# Patient Record
Sex: FEMALE | Race: WHITE | Employment: FULL TIME | URBAN - METROPOLITAN AREA
[De-identification: names, ages, dates, MRNs, and addresses within clinical notes are randomized per-mention and may not be internally consistent; named-entity substitution may affect disease eponyms.]

---

## 2024-07-01 ENCOUNTER — OFFICE VISIT (OUTPATIENT)
Dept: SURGERY | Facility: CLINIC | Age: 59
End: 2024-07-01
Payer: COMMERCIAL

## 2024-07-01 VITALS
HEIGHT: 65 IN | BODY MASS INDEX: 25.36 KG/M2 | HEART RATE: 84 BPM | DIASTOLIC BLOOD PRESSURE: 91 MMHG | TEMPERATURE: 98 F | RESPIRATION RATE: 20 BRPM | SYSTOLIC BLOOD PRESSURE: 139 MMHG | WEIGHT: 152.19 LBS

## 2024-07-01 DIAGNOSIS — Z85.3 HISTORY OF BREAST CANCER: ICD-10-CM

## 2024-07-01 DIAGNOSIS — C43.61 MALIGNANT MELANOMA OF RIGHT FOREARM (HCC): Primary | ICD-10-CM

## 2024-07-01 DIAGNOSIS — E31.21: ICD-10-CM

## 2024-07-01 PROBLEM — Z98.890 HISTORY OF MELANOMA EXCISION: Status: ACTIVE | Noted: 2024-07-01

## 2024-07-01 PROBLEM — Z85.820 HISTORY OF MELANOMA EXCISION: Status: ACTIVE | Noted: 2024-07-01

## 2024-07-01 PROBLEM — D22.9 ATYPICAL NEVUS: Status: ACTIVE | Noted: 2024-07-01

## 2024-07-01 PROCEDURE — 3080F DIAST BP >= 90 MM HG: CPT | Performed by: SURGERY

## 2024-07-01 PROCEDURE — 3008F BODY MASS INDEX DOCD: CPT | Performed by: SURGERY

## 2024-07-01 PROCEDURE — 99245 OFF/OP CONSLTJ NEW/EST HI 55: CPT | Performed by: SURGERY

## 2024-07-01 PROCEDURE — 3075F SYST BP GE 130 - 139MM HG: CPT | Performed by: SURGERY

## 2024-07-01 NOTE — CONSULTS
[facility]        Patient Name:  Shanice Stanford   YOB: 1965   Gender:  Female   Appt Date:  7/1/2024   Provider:  Raymond Rogers MD     PATIENT PROVIDERS  Referring Provider: Lori Villareal MD    Primary Care Provider:Deja Sharif PA-C        CHIEF COMPLAINT  New Consult: Melanoma of right forearm     PROBLEMS  Reviewed   Patient Active Problem List   Diagnosis    Displacement of lumbar intervertebral disc without myelopathy    Cervicalgia    Degeneration of cervical intervertebral disc    Lumbago    Multiple endocrine neoplasia type I (HCC)    History of breast cancer    Atypical nevus    Malignant melanoma of right forearm (HCC)    History of melanoma excision        History of Present Illness:  Patient is a 58 year old women who is currently being referred for consideration of surgical oncology management of recently diagnosed melanoma of right forearm. Hx of excision of melanoma on L thigh(26 years ago), triple negative breast cancer (Negative for BRCA gene; Radiation and chemo- 8 years ago), and endocrine neoplasia (monitored by Joy Cannon MD).     Patient had concerns of a skin lesion and was seen by Dr. Lori Villareal on 06/18/2024 skin examination.  She noticed this lesion in winter as a dry spot. She denies any bleeding, pruritus, or scaling of the lesion. In office, an excisional biopsy was performed. This revealed a tumor thickness of 0.5 mm (pT1a).     Patient has MEN, she is followed by Dr. Joy Cannon at [facility].    She endorses blistering sunburns and prior history of extended periods in the sun. She does have a history of melanoma of her left thigh. Denies any family history of skin cancer.      Vital Signs:  BP (!) 139/91 (BP Location: Right arm, Patient Position: Sitting, Cuff Size: adult)   Pulse 84   Temp 98.3 °F (36.8 °C) (Temporal)   Resp 20   Ht 1.651 m (5' 5\")   Wt 69 kg (152 lb 3.2 oz)   BMI 25.33 kg/m²      Medications  Reviewed:    Current Outpatient Medications:     LEVOTHROID 75 MCG OR TABS, 1 TABLET DAILY, Disp: , Rfl:      Allergies Reviewed:  Allergies   Allergen Reactions    Hydrocodone-Acetaminophen NAUSEA AND VOMITING    Adhesive Tape ITCHING     More with length of time        History:  Reviewed:  Past Medical History:    CANCER    skin    Hx of breast cancer    HYPOTHYROIDISM    KIDNEY STONE    OTHER DISEASES    hx multiple endocrine neoplasia    VARICELLA      Reviewed:  Past Surgical History:   Procedure Laterality Date    Breast reconstruction      x2    Mastectomy left      Other surgical history  1999    excision of melanoma L thigh    Other surgical history  1995    lithotrypsyx2;removal of stone    Parathyroidectomy      Tonsillectomy        Reviewed Social History:  Social History     Socioeconomic History    Marital status:    Tobacco Use    Smoking status: Former     Current packs/day: 0.00     Average packs/day: 0.5 packs/day for 10.0 years (5.0 ttl pk-yrs)     Types: Cigarettes     Start date: 1980     Quit date: 1990     Years since quittin.1   Vaping Use    Vaping status: Never Used   Substance and Sexual Activity    Alcohol use: Yes     Comment: socially    Drug use: No    Sexual activity: Yes     Partners: Male      Reviewed:  Family History   Problem Relation Age of Onset    Neurological Disorder Mother         hx benign pituitary tumor    Other Mother         multiple endocrin neoplasia    Diabetes Mother     Stroke Mother     Pulmonary Disease Mother         pneumonia    Renal Disease Mother         kidney stones    Pulmonary Disease Father         COPD    Heart Disease Father       Review of Systems:  GENERAL HEALTH: feels well, no fatigue.   SKIN: no change in mole.   HEENT: denies pain  RESPIRATORY: denies shortness of breath  CARDIOVASCULAR: denies chest pain  GI: denies nausea, vomiting, constipation, diarrhea; no rectal bleeding  GENITAL/: no blood in  urine  MUSCULOSKELETAL: no joint complaints, no back pain  NEURO: no tingling, numbness, weakness  ENDOCRINE: denies weight loss/gain  PSYCH: anxious       Physical Examination:  Constitutional: NAD.   Eyes: Sclera: non-icteric.   Lymph Nodes: Lymph Nodes no cervical LAD, supraclavicular LAD, axillary LAD, or inguinal LAD.   Lungs: Normal respiratory effort.  Cardiovascular: Well-perfused.  Abdomen: Soft, no masses.  Musculoskeletal: Extremities: no edema.   Skin: Biopsy site right forearm healing well.  Sutures are in place.  Scar is oriented transverse obliquely measures about 3 cm.  There are no satellite lesions.  No in-transit metastases.  No epitrochlear lymphadenopathy.  Remainder of skin examination unremarkable.     Document Review:  06/18/2024 [facility]  Case number [id number]  Right flexor forearm (excision):  Histologic type: Superficial spreading  Maximum tumor thickness: 0.5 mm  Ulceration: Not present  Peripheral margin: Not involved  Deep margin: Not involved  Lymphovascular invasion: Not seen  Perineural invasion: Not seen  Tumor regression: Present  AJCC stage /TNM: pT1a  Mitotic rate: None     Procedure(s):  None     Assessment / Plan:  1. Malignant melanoma of right forearm (pT1a cN0)  -Prior history of melanoma left posterior thigh.  Findings were discussed with patient at length.  Her  was present.  Entity melanoma and its staging were discussed.  Patient understands that she will require a wide excision with a 1 cm margin.  The site is slightly swollen and will allow for a period of healing prior to undertaking surgical management.  Will reevaluate in about 1 months.  At that time, will further discuss whether reconstruction with our plastic surgery colleagues is warranted.  Timing works out well for her as she will be on vacation August 10 through 17th.  Otherwise, wide excision can be done under local anesthesia.  The surgical treatment was discussed with her including  indications and risks.  Patient agreed and understood.  She and her  had ample time to ask questions.    2. Multiple endocrine neoplasia type I (HCC)  Follows up at [facility].    3.  History of breast cancer  Last mammogram August 2023.      Follow Up:  In 3 to 4 weeks.       Electronically Signed by:     Raymond Rogers MD FACS  Chief of Surgical Oncology, [facility]  Professor of Surgery, [facility]  [phone]

## 2024-07-03 DIAGNOSIS — C43.61 MALIGNANT MELANOMA OF RIGHT FOREARM (HCC): Primary | ICD-10-CM

## 2024-07-05 ENCOUNTER — LAB ENCOUNTER (OUTPATIENT)
Dept: LAB | Facility: HOSPITAL | Age: 59
End: 2024-07-05
Attending: SURGERY
Payer: COMMERCIAL

## 2024-07-05 DIAGNOSIS — C43.61 MALIGNANT MELANOMA OF ARM, RIGHT (HCC): Primary | ICD-10-CM

## 2024-07-05 PROCEDURE — 88321 CONSLTJ&REPRT SLD PREP ELSWR: CPT

## 2024-07-24 ENCOUNTER — OFFICE VISIT (OUTPATIENT)
Dept: SURGERY | Facility: CLINIC | Age: 59
End: 2024-07-24
Payer: COMMERCIAL

## 2024-07-24 VITALS
HEART RATE: 81 BPM | TEMPERATURE: 98 F | BODY MASS INDEX: 25 KG/M2 | SYSTOLIC BLOOD PRESSURE: 132 MMHG | RESPIRATION RATE: 20 BRPM | WEIGHT: 153 LBS | DIASTOLIC BLOOD PRESSURE: 84 MMHG

## 2024-07-24 DIAGNOSIS — C43.61 MALIGNANT MELANOMA OF RIGHT FOREARM (HCC): Primary | ICD-10-CM

## 2024-07-24 PROCEDURE — 3075F SYST BP GE 130 - 139MM HG: CPT | Performed by: SURGERY

## 2024-07-24 PROCEDURE — 3079F DIAST BP 80-89 MM HG: CPT | Performed by: SURGERY

## 2024-07-24 PROCEDURE — 99243 OFF/OP CNSLTJ NEW/EST LOW 30: CPT | Performed by: SURGERY

## 2024-07-24 PROCEDURE — 99213 OFFICE O/P EST LOW 20 MIN: CPT | Performed by: SURGERY

## 2024-07-24 NOTE — PROGRESS NOTES
[facility]        Patient Name:  Shanice Stanford   YOB: 1965   Gender:  Female   Appt Date:  7/24/2024   Provider:  Raymond Rogers MD     PATIENT PROVIDERS    Primary Care Provider:Deja Sharif PA-C        CHIEF COMPLAINT  New Consult: Melanoma of right forearm     PROBLEMS  Reviewed   Patient Active Problem List   Diagnosis    Displacement of lumbar intervertebral disc without myelopathy    Cervicalgia    Degeneration of cervical intervertebral disc    Lumbago    Multiple endocrine neoplasia type I (HCC)    History of breast cancer    Atypical nevus    Malignant melanoma of right forearm (HCC)    History of melanoma excision        History of Present Illness:  Patient returns today for a follow-up visit.    She is a 58 year old women who is currently being referred for consideration of surgical oncology management of recently diagnosed melanoma of right forearm.   She saw me in consultation on 7/1/2024 and the site was swollen.  We are allowing for.  Of healing and thus this follow-up visit.    History:  Patient had concerns of a skin lesion and was seen by Dr. Lori Villareal on 06/18/2024 skin examination.  She noticed this lesion in winter as a dry spot. She denies any bleeding, pruritus, or scaling of the lesion. In office, an excisional biopsy was performed. This revealed a tumor thickness of 0.5 mm (pT1a).     Patient has MEN, she is followed by Dr. Joy Cannon at [facility].     Vital Signs:  /84 (BP Location: Right arm, Patient Position: Sitting, Cuff Size: adult)   Pulse 81   Temp 97.9 °F (36.6 °C) (Temporal)   Resp 20   Wt 69.4 kg (153 lb)   BMI 25.46 kg/m²      Medications Reviewed:    Current Outpatient Medications:     LEVOTHROID 75 MCG OR TABS, 1 TABLET DAILY, Disp: , Rfl:      Allergies Reviewed:  Allergies   Allergen Reactions    Hydrocodone-Acetaminophen NAUSEA AND VOMITING    Adhesive Tape ITCHING     More with length of time         History:  Reviewed:  Past Medical History:    CANCER    skin    Hx of breast cancer    HYPOTHYROIDISM    KIDNEY STONE    OTHER DISEASES    hx multiple endocrine neoplasia    VARICELLA      Reviewed:  Past Surgical History:   Procedure Laterality Date    Breast reconstruction      x2    Mastectomy left      Other surgical history  1999    excision of melanoma L thigh    Other surgical history  1995    lithotrypsyx2;removal of stone    Parathyroidectomy      Tonsillectomy        Reviewed Social History:  Social History     Socioeconomic History    Marital status:    Tobacco Use    Smoking status: Former     Current packs/day: 0.00     Average packs/day: 0.5 packs/day for 10.0 years (5.0 ttl pk-yrs)     Types: Cigarettes     Start date: 1980     Quit date: 1990     Years since quittin.1   Vaping Use    Vaping status: Never Used   Substance and Sexual Activity    Alcohol use: Yes     Comment: socially    Drug use: No    Sexual activity: Yes     Partners: Male      Reviewed:  Family History   Problem Relation Age of Onset    Neurological Disorder Mother         hx benign pituitary tumor    Other Mother         multiple endocrin neoplasia    Diabetes Mother     Stroke Mother     Pulmonary Disease Mother         pneumonia    Renal Disease Mother         kidney stones    Pulmonary Disease Father         COPD    Heart Disease Father       Review of Systems:  GENERAL HEALTH: feels well, no fatigue.   SKIN: +change in mole right flank/lower back.   RESPIRATORY: denies shortness of breath  CARDIOVASCULAR: denies chest pain  GI: No symptoms  PSYCH: anxious       Physical Examination:  Constitutional: NAD.   Eyes: Sclera: non-icteric.   Lungs: Clear  Cardiovascular: Regular rate  Abdomen: Soft, no masses.  Musculoskeletal: Extremities: no edema.   Skin: Biopsy site right forearm healing well.  Swelling is markedly down.  Skin lesion of concern right lower back/flank is about 5 mm slightly  irregular and homogenous color.     Document Review:  06/18/2024 [facility]  Case number [id number]  Right flexor forearm (excision):  Histologic type: Superficial spreading  Maximum tumor thickness: 0.5 mm  Ulceration: Not present  Peripheral margin: Not involved  Deep margin: Not involved  Lymphovascular invasion: Not seen  Perineural invasion: Not seen  Tumor regression: Present  AJCC stage /TNM: pT1a  Mitotic rate: None     Procedure(s):  None     Assessment / Plan:  1. Malignant melanoma of right forearm (pT1a cN0)  -Prior history of melanoma left posterior thigh.  Findings were discussed with patient at length.    Biopsy site is much less swollen than prior examination.  We can proceed with scheduling wide excision with a 1 cm margin.  Patient will meet with Dr. Luis from plastic surgery for consideration of reconstruction.  From my standpoint, I can do a wide excision under local anesthesia and should Dr. Luis require sedation/general anesthesia, I discussed further risks.  The surgical treatment matter including risks was discussed.  Patient agreed and understood.  Arrangements will be made to schedule the procedure.  May consider biopsying of skin lesion right lower back/flank at that time.  Patient had ample time to ask questions.         Follow Up:  To schedule wide excision.       Electronically Signed by:     Raymond Rogers MD FACS  Chief of Surgical Oncology, [facility]  Professor of Surgery, [facility]  [phone]

## 2024-07-24 NOTE — PATIENT INSTRUCTIONS
Surgeon:              Dr. Pricilla Luis, PhD                                          Tel:         [phone]                                  Fax:        [phone]      Surgery/Procedure: Right forearm reconstruction with complex closure, possible grafts, possible local tissue rearrangement  1.25 hours for Dr. Luis's portion, joint with Dr. Rogers, ALBERTO anesthesia, outpatient     Dx Code: [C43.61]     Hospital:  [facility]: [address] [place] [address]           [phone]  [facility]: [address], [place] [address]               [phone]    1. Someone will need to drive you to and from the hospital if your procedure is outpatient.    2.Do not drink alcohol or smoke 24 hours prior to your procedure.    3. Bring a picture ID and your insurance card.    4. You will be contacted by the hospital the day before to confirm the procedure time and location.     5. Do not take any herbal supplements or blood thinners at least one week before your procedure/surgery. This includes NSAID's (aspirin, baby aspirin, Motrin, Ibuprofen, Aleve, Advil, Naproxen, etc), fish oil, vitamin E, turmeric, CoQ10, or green tea supplements, etc. *TYLENOL or acetaminophen is ok to take*    6. PRE-OPERATIVE TESTING: History and physical with medical clearance is REQUIRED within 30 days of the surgery date and is mandatory per Dr. Luis. *If this is not done, your surgery will be postponed*  MEDICAL CLEARANCE WITH DR. LENTZ  CBC  CMP  EKG (within 90 days)    7. Please inform us if you start or change any medications at least one week before surgery (ex: blood thinners, weight loss medications, diabetic medications, herbal supplements, etc)    8. Does patient have diagnosis of sleep apnea?    [   ] Yes     [ X ]  No    Consent obtained   Photos taken on 7/24/24

## 2024-07-24 NOTE — H&P (VIEW-ONLY)
[facility]        Patient Name:  Shanice Stanford   YOB: 1965   Gender:  Female   Appt Date:  7/24/2024   Provider:  Raymond Rogers MD     PATIENT PROVIDERS    Primary Care Provider:Deja Sharif PA-C        CHIEF COMPLAINT  New Consult: Melanoma of right forearm     PROBLEMS  Reviewed   Patient Active Problem List   Diagnosis    Displacement of lumbar intervertebral disc without myelopathy    Cervicalgia    Degeneration of cervical intervertebral disc    Lumbago    Multiple endocrine neoplasia type I (HCC)    History of breast cancer    Atypical nevus    Malignant melanoma of right forearm (HCC)    History of melanoma excision        History of Present Illness:  Patient returns today for a follow-up visit.    She is a 58 year old women who is currently being referred for consideration of surgical oncology management of recently diagnosed melanoma of right forearm.   She saw me in consultation on 7/1/2024 and the site was swollen.  We are allowing for.  Of healing and thus this follow-up visit.    History:  Patient had concerns of a skin lesion and was seen by Dr. Lori Villareal on 06/18/2024 skin examination.  She noticed this lesion in winter as a dry spot. She denies any bleeding, pruritus, or scaling of the lesion. In office, an excisional biopsy was performed. This revealed a tumor thickness of 0.5 mm (pT1a).     Patient has MEN, she is followed by Dr. Joy Cannon at [facility].     Vital Signs:  /84 (BP Location: Right arm, Patient Position: Sitting, Cuff Size: adult)   Pulse 81   Temp 97.9 °F (36.6 °C) (Temporal)   Resp 20   Wt 69.4 kg (153 lb)   BMI 25.46 kg/m²      Medications Reviewed:    Current Outpatient Medications:     LEVOTHROID 75 MCG OR TABS, 1 TABLET DAILY, Disp: , Rfl:      Allergies Reviewed:  Allergies   Allergen Reactions    Hydrocodone-Acetaminophen NAUSEA AND VOMITING    Adhesive Tape ITCHING     More with length of time         History:  Reviewed:  Past Medical History:    CANCER    skin    Hx of breast cancer    HYPOTHYROIDISM    KIDNEY STONE    OTHER DISEASES    hx multiple endocrine neoplasia    VARICELLA      Reviewed:  Past Surgical History:   Procedure Laterality Date    Breast reconstruction      x2    Mastectomy left      Other surgical history  1999    excision of melanoma L thigh    Other surgical history  1995    lithotrypsyx2;removal of stone    Parathyroidectomy      Tonsillectomy        Reviewed Social History:  Social History     Socioeconomic History    Marital status:    Tobacco Use    Smoking status: Former     Current packs/day: 0.00     Average packs/day: 0.5 packs/day for 10.0 years (5.0 ttl pk-yrs)     Types: Cigarettes     Start date: 1980     Quit date: 1990     Years since quittin.1   Vaping Use    Vaping status: Never Used   Substance and Sexual Activity    Alcohol use: Yes     Comment: socially    Drug use: No    Sexual activity: Yes     Partners: Male      Reviewed:  Family History   Problem Relation Age of Onset    Neurological Disorder Mother         hx benign pituitary tumor    Other Mother         multiple endocrin neoplasia    Diabetes Mother     Stroke Mother     Pulmonary Disease Mother         pneumonia    Renal Disease Mother         kidney stones    Pulmonary Disease Father         COPD    Heart Disease Father       Review of Systems:  GENERAL HEALTH: feels well, no fatigue.   SKIN: +change in mole right flank/lower back.   RESPIRATORY: denies shortness of breath  CARDIOVASCULAR: denies chest pain  GI: No symptoms  PSYCH: anxious       Physical Examination:  Constitutional: NAD.   Eyes: Sclera: non-icteric.   Lungs: Clear  Cardiovascular: Regular rate  Abdomen: Soft, no masses.  Musculoskeletal: Extremities: no edema.   Skin: Biopsy site right forearm healing well.  Swelling is markedly down.  Skin lesion of concern right lower back/flank is about 5 mm slightly  irregular and homogenous color.     Document Review:  06/18/2024 [facility]  Case number [id number]  Right flexor forearm (excision):  Histologic type: Superficial spreading  Maximum tumor thickness: 0.5 mm  Ulceration: Not present  Peripheral margin: Not involved  Deep margin: Not involved  Lymphovascular invasion: Not seen  Perineural invasion: Not seen  Tumor regression: Present  AJCC stage /TNM: pT1a  Mitotic rate: None     Procedure(s):  None     Assessment / Plan:  1. Malignant melanoma of right forearm (pT1a cN0)  -Prior history of melanoma left posterior thigh.  Findings were discussed with patient at length.    Biopsy site is much less swollen than prior examination.  We can proceed with scheduling wide excision with a 1 cm margin.  Patient will meet with Dr. Luis from plastic surgery for consideration of reconstruction.  From my standpoint, I can do a wide excision under local anesthesia and should Dr. Luis require sedation/general anesthesia, I discussed further risks.  The surgical treatment matter including risks was discussed.  Patient agreed and understood.  Arrangements will be made to schedule the procedure.  May consider biopsying of skin lesion right lower back/flank at that time.  Patient had ample time to ask questions.         Follow Up:  To schedule wide excision.       Electronically Signed by:     Raymond Rogers MD FACS  Chief of Surgical Oncology, [facility]  Professor of Surgery, [facility]  [phone]

## 2024-07-24 NOTE — CONSULTS
New Patient Consultation    Chief Complaint: Malignant melanoma of right forearm     History of Present Illness:   Shanice Stanford is a 58 year old female referred by Dr. Rogers for evaluation of recently diagnosed malignant melanoma of the right forearm. This was diagnosed 6/18/2024 via in-office excision at her Dermatologist's office. She met with Dr. Rogers today and he is planning for wide excision with a 1 cm margin. She is here today to discuss options for reconstruction.     Pathology from 6/18/2024 revealed:    A. RIGHT FLEXOR FOREARM (EXCISION)  MALIGNANT MELANOMA    Comment: Staging Summary     Histologic Type: Superficial Spreading  Maximum Tumor Thickness: 0.5 mm  Ulceration: Not present  Peripheral Margin: Not involved  Deep Margin: Not involved  Lymphovascular Invasion: Not seen   Perineural Invasion: Not seen  Tumor Regression: Present  AJCC Stage / TNM: pT1a  Mitotic Rate: None        Past Medical History:      Past Medical History:    CANCER    skin    Hx of breast cancer    HYPOTHYROIDISM    KIDNEY STONE    OTHER DISEASES    hx multiple endocrine neoplasia    VARICELLA         Past Surgical History:  Past Surgical History:   Procedure Laterality Date    Breast reconstruction      x2    Mastectomy left      Other surgical history  02/01/1999    excision of melanoma L thigh    Other surgical history  01/01/1995    lithotrypsyx2;removal of stone    Parathyroidectomy      Tonsillectomy           Medications:    No outpatient medications have been marked as taking for the 7/24/24 encounter (Office Visit) with Pricilla Luis MD.         Allergies:    Allergies   Allergen Reactions    Hydrocodone-Acetaminophen NAUSEA AND VOMITING    Adhesive Tape ITCHING     More with length of time         Family History:   Family History   Problem Relation Age of Onset    Neurological Disorder Mother         hx benign pituitary tumor    Other Mother         multiple endocrin neoplasia    Diabetes Mother     Stroke  Mother     Pulmonary Disease Mother         pneumonia    Renal Disease Mother         kidney stones    Pulmonary Disease Father         COPD    Heart Disease Father          Social History:  History   Alcohol Use    Yes     Comment: socially       History   Smoking Status    Former    Types: Cigarettes   Smokeless Tobacco    Not on file       History   Drug Use No       Review of Systems:    A 13 point review of systems was performed on the intake sheet.  The patient reports   General:   The patient denies, fever, chills, night sweats, fatigue, generalized weakness, change in appetite, weight loss, or +weight gain.  Endocrine:   There is no history of poor/slow wound healing, weight loss/+gain, fertility or hormone problems, +cold intolerance, +heat intolerance, excessive thirst, or +thyroid disease.   Allergic/Immunologic:  There is no history of hives, hay fever, angioedema or anaphylaxis.  HEENT:    The patient denies ear pain, ear drainage, +hearing loss, change in vision, double vision, cataracts, glaucoma, nasal congestion, nosebleed, hoarseness, sore throat, or swollen glands  Respiratory:   The patient denies shortness of breath, cough, bloody cough, phlegm, asthma, or wheezing  Cardiovascular:  The patient denies chest pain/pressure, palpitations, irregular heartbeat, high blood pressure, stroke, or leg swelling  Breasts:  Patient denies breast masses, pain, change in the breast skin, skin dimpling, nipple discharge, or rash  Gastrointestinal:   There is no history of +difficulty swallowing, +reflux symptoms, +heartburn, nausea, vomiting, dark/ bloody stools, diarrhea, constipation,  change in bowel habits, or abdominal pain.   Genitourinary:  The patient denies +frequent urination, +needing to get up at night to urinate, urinary hesitancy or retaining urine, painful urination, urinary incontinence, decreased urine stream, blood in the urine, or vaginal/penile discharge.  Skin:   The patient denies rash,  itching, +skin lesions, dry skin, change in skin color or +change in moles, sunburns, or sunburns with blistering.   Hematologic/Lymphatic:  The patient denies easily bruising or bleeding, persistent swollen glands or lymph nodes, bleeding disorders, blood clots, or pulmonary embolism.   Gynecologic:  The patient denies irregular menses, pelvic pain, +pain with intercourse, painful menses, or pregnancy  Musculoskeletal:  The patient denies muscle aches/pain, +joint pain, +stiff joints, neck pain, back pain or bone pain.  Neurologic:  There is no history of migraines or severe headaches, seizure/epilepsy, speech problems, coordination problems, trembling/tremors, fainting/black outs, dizziness, memory problems, loss of sensation/numbness, problems walking, weakness, tingling or burning in hands/feet.   Psychiatric:  There is no history of abusive relationship, bipolar disorder, sleep disturbance, anxiety, depression or feeling of despair.    Physical Exam:    There were no vitals taken for this visit.    Constitutional: The patient is an alert, oriented and well-developed.     Neurologic: Speech patterns and movements are normal.     Psychiatric: Affect is appropriate.    Eyes: Conjunctiva are clear, non-icteric. PERRL    ENT: no obvious abnormality, no ear drainage, mucous membranes moist and pink    Integument/Skin: see Musculoskeletal exam    Respiratory: Normal respiratory effort.     Cardiovascular: no cyanosis, no edema    Musculoskeletal: Right forearm with scar    Impression:   Shanice Stanford  is a 58 year old with malignant melanoma right forearm    Discussion and Plan:  The patient was counseled on the different treatment options.     Dr. Rogers is planning for wide excision with a 1 cm margin. We discussed the option for right forearm reconstruction with complex closure, possible grafts, possible local tissue rearrangement. She is asking for this to be done under MAC anesthesia. This will be done in the  outpatient setting. Of note, the patient has an adhesive allergy. Per patient, she is fine with steri-strips for several days but then sometimes needs to remove them if irritation of the skin is noted.    She is ok with a longer scar but would like to avoid contour irregularities.  The patient is agreeable with this plan.      She also is considering breast reconstruction revision (she had AKILA free flap L breast with Dr Arrington and revision with Dr Rodríguez and Dr Angel, she will address her melanoma first before revisiting any breast revision procedures).    The different treatment options were discussed with the patient.  The procedures and the postoperative care was discussed in detail.  Potential risks complications benefits and alternatives were discussed.  Risks and complications including but not limited to infection, bleeding, scarring, damage to surrounding structures, such as nerves, blood vessels, muscles,  tendons, risk of hematoma, seroma, foreign body reaction, allergic reaction, wound dehiscences, delayed wound healing, graft failure, flap failure, need for further surgery.    Patient understands and wishes to proceed with the procedure, questions were answered.    45 minutes were spent with the patient, from which 35 minutes were spent counseling the patient and coordinating care.

## 2024-07-25 DIAGNOSIS — C43.61 MALIGNANT MELANOMA OF RIGHT FOREARM (HCC): Primary | ICD-10-CM

## 2024-07-25 NOTE — PATIENT INSTRUCTIONS
Surgery:  Wide excision melanoma right forearm    Date of Surgery: TBD with [facility]    Hospital:    [facility] [address] [place] [address]  Phone: [phone]    This is an inpatient / outpatient: Outpatient procedure.  Use the provided Chlorhexadine surgical soap(instructions attached) to shower the night before and morning of your procedure.  Do not apply powders, creams, lotions or deodorant after showering.  Do not apply any kind of makeup and make sure to remove nail polish prior to your surgery.  Bowel Prep: No   If you take Insulin contact your primary care physician for specific instructions regarding dosing around your surgery.  Do not drink alcohol or smoke tobacco products 24 hours prior to procedure.   Bring your picture ID and insurance card with you.  Wear comfortable clothing that can easily be removed. Preferably, something that zips, snaps, or buttons up the front.   You will be contacted by the hospital  for pre-admission COVID-19 testing and the day prior to your surgery to confirm details and give you specific instructions about when and where to arrive the day of your procedure.   If you are taking blood thinners including: Plavix, Eliquis, Xarelto, Coumadin, full strength Aspirin you will need to contact the prescribing provider for specific instructions on holding these medications for your procedure.  Inform your primary care physician of your surgery and ask if him/her will need to see you prior to surgery.  If you develop symptoms of another illness prior to surgery please contact our office immediately.   If this is an inpatient surgery, attending the Surgical Oncology Pre-operative Education Class is strongly encouraged. Email [email or web address] to RSVP or for more class information.       Pre-Operative Testing   CBC  CMP  BMP    PT, PTT, INR  UA  EKG    Chest X-Ray  Cardiac Clearance  H & P Medical Clearance     Raymond Rogers MD, FACS  Chief of  Surgical Oncology  Co-Medical Director, Oncology Services  Professor of Surgery    For Dr. Rogers's office: [phone]  Fax: [phone]  After hours you will reach the answering service    Pre-Admission [phone]  Central [phone]  Medical Records:   [phone]    Diagnosis: Malignant melanoma of right forearm (pT1a cN0)     SURGEON: Dr. Raymond Rogers    LOCATION: [facility]    Type: Outpatient    Length of surgery: 30 minutes  Anesthesia:MAC  Joint case with:  SA:  No   Special Equipment:   Special request:     Allergies:   Allergies   Allergen Reactions    Hydrocodone-Acetaminophen NAUSEA AND VOMITING    Adhesive Tape ITCHING     More with length of time       Orders:  No  -Colon Bundle/Bowel Prep  No  -Type and cross 2 units PRBC  No  -Type and Screen  No  -Advanced Oncology Order Set (HIPEC)  No  -Heparin Pre-Op  No  -Nuclear Med Injection  No  -Wire localization needed  No  -Midline placement (HIPIC, Whipple, Esophagectomy, Liver)  No  -Tylenol administration prior to surgery  Does patient have a pacemaker?: No    No  -CHG Cloths (Main)    P.A.T. orders:     For RN use only:

## 2024-08-01 ENCOUNTER — TELEPHONE (OUTPATIENT)
Dept: SURGERY | Facility: CLINIC | Age: 59
End: 2024-08-01

## 2024-08-01 NOTE — TELEPHONE ENCOUNTER
Disability Certification forms received via email, sending MOVL for Release of Information completion logged for processing.

## 2024-08-06 ENCOUNTER — APPOINTMENT (OUTPATIENT)
Dept: ADMINISTRATIVE | Facility: HOSPITAL | Age: 59
End: 2024-08-06
Payer: COMMERCIAL

## 2024-08-06 ENCOUNTER — TELEPHONE (OUTPATIENT)
Dept: SURGERY | Facility: CLINIC | Age: 59
End: 2024-08-06

## 2024-08-07 DIAGNOSIS — C43.61 MALIGNANT MELANOMA OF RIGHT FOREARM (HCC): Primary | ICD-10-CM

## 2024-08-08 NOTE — TELEPHONE ENCOUNTER
Patient called to confirm Release of Information was received. Informed patient Release of Information received and scanned in chart. Patient verbalized understanding.   Manual Repair Warning Statement: We plan on removing the manually selected variable below in favor of our much easier automatic structured text blocks found in the previous tab. We decided to do this to help make the flow better and give you the full power of structured data. Manual selection is never going to be ideal in our platform and I would encourage you to avoid using manual selection from this point on, especially since I will be sunsetting this feature. It is important that you do one of two things with the customized text below. First, you can save all of the text in a word file so you can have it for future reference. Second, transfer the text to the appropriate area in the Library tab. Lastly, if there is a flap or graft type which we do not have you need to let us know right away so I can add it in before the variable is hidden. No need to panic, we plan to give you roughly 6 months to make the change.

## 2024-08-08 NOTE — TELEPHONE ENCOUNTER
Cld patient/ sent message via Doormen. to encourage call to obtain details, placed on hold but put in Oncology folder for future processing.

## 2024-08-08 NOTE — TELEPHONE ENCOUNTER
Please try to avoid signing forms in the corner as it is not visible when printing and forms are not accepted this way. Thank you!     Dr. Pricilla Luis, Dr. Raymond Rogers,                           (EITHER OF YOU CAN SIGN)      *The ACKNOWLEDGE button has been moved to the top right ribbon*    Please sign off on form if you agree to: disability 08/20/2024-6/8 wk recovery   (place your signature on the first page only)    -From your Inbasket, Highlight the patient and click Chart   -Double click the 08/01/2024 Forms Completion telephone encounter  -Scroll down to the Media section   -Click the blue Hyperlink: Disability Dr. Pricilla Luis/ Dr. Raymond Rogers 08/08/2024  -Click Acknowledge located in the top right ribbon/menu   -Drag the mouse into the blank space of the document and a + sign will appear. Left click to   electronically sign the document.     Thank you,  Na BLEVINS

## 2024-08-08 NOTE — TELEPHONE ENCOUNTER
Patient called back and provided details. Patient will complete Release of Information and email to forms department today. Email address was provided.   Patient ask if forms can be completed as STAT.    PATIENT ALSO REQUEST FORMS UPLOADED TO [employer] !      Type of Leave: disability Medical Certification  Reason for Leave: Cancer   Start date of leave: Start: 8/20/24   End: 6-8 weeks  How much time needed?: ASAP  Forms Due Date:  Was Fee and Turnaround info Given?: Yes

## 2024-08-09 NOTE — TELEPHONE ENCOUNTER
Please try to avoid signing forms in the corner as it is not visible when printing and forms are not accepted this way. Thank you!      Dr. Pricilla Luis, Dr. Raymond Rogers,                            (EITHER OF YOU CAN SIGN)      *The ACKNOWLEDGE button has been moved to the top right ribbon*     Please sign off on form if you agree to: disability 08/20/2024-6/8 wk recovery   (place your signature on the first page only)     -From your Inbasket, Highlight the patient and click Chart   -Double click the 08/01/2024 Forms Completion telephone encounter  -Scroll down to the Media section   -Click the blue Hyperlink: Disability Dr. Pricilla Luis/ Dr. Raymond Rogers 08/08/2024  -Click Acknowledge located in the top right ribbon/menu   -Drag the mouse into the blank space of the document and a + sign will appear. Left click to   electronically sign the document.     Thank you,

## 2024-08-09 NOTE — TELEPHONE ENCOUNTER
Doctor's office called in behalf of dr. Luis to check on status of forms. I told her that the forms have not been signed and I will send them urgently.

## 2024-08-13 ENCOUNTER — TELEPHONE (OUTPATIENT)
Dept: SURGERY | Facility: CLINIC | Age: 59
End: 2024-08-13

## 2024-08-20 ENCOUNTER — ANESTHESIA (OUTPATIENT)
Dept: SURGERY | Facility: HOSPITAL | Age: 59
End: 2024-08-20
Payer: COMMERCIAL

## 2024-08-20 ENCOUNTER — ANESTHESIA EVENT (OUTPATIENT)
Dept: SURGERY | Facility: HOSPITAL | Age: 59
End: 2024-08-20
Payer: COMMERCIAL

## 2024-08-20 ENCOUNTER — HOSPITAL ENCOUNTER (OUTPATIENT)
Facility: HOSPITAL | Age: 59
Setting detail: HOSPITAL OUTPATIENT SURGERY
Discharge: HOME | End: 2024-08-20
Attending: SURGERY | Admitting: SURGERY
Payer: COMMERCIAL

## 2024-08-20 VITALS
RESPIRATION RATE: 18 BRPM | OXYGEN SATURATION: 93 % | HEART RATE: 61 BPM | SYSTOLIC BLOOD PRESSURE: 149 MMHG | DIASTOLIC BLOOD PRESSURE: 74 MMHG | WEIGHT: 151 LBS | TEMPERATURE: 98 F | BODY MASS INDEX: 25.16 KG/M2 | HEIGHT: 65 IN

## 2024-08-20 DIAGNOSIS — C43.61 MALIGNANT MELANOMA OF RIGHT FOREARM (HCC): ICD-10-CM

## 2024-08-20 PROCEDURE — 88305 TISSUE EXAM BY PATHOLOGIST: CPT | Performed by: SURGERY

## 2024-08-20 PROCEDURE — 0HXDXZZ TRANSFER RIGHT LOWER ARM SKIN, EXTERNAL APPROACH: ICD-10-PCS | Performed by: SURGERY

## 2024-08-20 RX ORDER — DIPHENHYDRAMINE HYDROCHLORIDE 50 MG/ML
12.5 INJECTION INTRAMUSCULAR; INTRAVENOUS AS NEEDED
Status: DISCONTINUED | OUTPATIENT
Start: 2024-08-20 | End: 2024-08-20

## 2024-08-20 RX ORDER — TRAMADOL HYDROCHLORIDE 50 MG/1
TABLET ORAL EVERY 6 HOURS PRN
Qty: 15 TABLET | Refills: 0 | Status: SHIPPED | OUTPATIENT
Start: 2024-08-20

## 2024-08-20 RX ORDER — HYDROCODONE BITARTRATE AND ACETAMINOPHEN 10; 325 MG/1; MG/1
1 TABLET ORAL ONCE AS NEEDED
Status: DISCONTINUED | OUTPATIENT
Start: 2024-08-20 | End: 2024-08-20

## 2024-08-20 RX ORDER — NALOXONE HYDROCHLORIDE 0.4 MG/ML
80 INJECTION, SOLUTION INTRAMUSCULAR; INTRAVENOUS; SUBCUTANEOUS AS NEEDED
Status: DISCONTINUED | OUTPATIENT
Start: 2024-08-20 | End: 2024-08-20

## 2024-08-20 RX ORDER — SODIUM CHLORIDE, SODIUM LACTATE, POTASSIUM CHLORIDE, CALCIUM CHLORIDE 600; 310; 30; 20 MG/100ML; MG/100ML; MG/100ML; MG/100ML
INJECTION, SOLUTION INTRAVENOUS CONTINUOUS
Status: DISCONTINUED | OUTPATIENT
Start: 2024-08-20 | End: 2024-08-20

## 2024-08-20 RX ORDER — HYDROMORPHONE HYDROCHLORIDE 1 MG/ML
0.2 INJECTION, SOLUTION INTRAMUSCULAR; INTRAVENOUS; SUBCUTANEOUS EVERY 5 MIN PRN
Status: DISCONTINUED | OUTPATIENT
Start: 2024-08-20 | End: 2024-08-20

## 2024-08-20 RX ORDER — ONDANSETRON 2 MG/ML
4 INJECTION INTRAMUSCULAR; INTRAVENOUS EVERY 6 HOURS PRN
Status: DISCONTINUED | OUTPATIENT
Start: 2024-08-20 | End: 2024-08-20

## 2024-08-20 RX ORDER — HYDROCODONE BITARTRATE AND ACETAMINOPHEN 10; 325 MG/1; MG/1
2 TABLET ORAL ONCE AS NEEDED
Status: DISCONTINUED | OUTPATIENT
Start: 2024-08-20 | End: 2024-08-20

## 2024-08-20 RX ORDER — MIDAZOLAM HYDROCHLORIDE 1 MG/ML
INJECTION INTRAMUSCULAR; INTRAVENOUS AS NEEDED
Status: DISCONTINUED | OUTPATIENT
Start: 2024-08-20 | End: 2024-08-20 | Stop reason: SURG

## 2024-08-20 RX ORDER — LABETALOL HYDROCHLORIDE 5 MG/ML
5 INJECTION, SOLUTION INTRAVENOUS EVERY 5 MIN PRN
Status: DISCONTINUED | OUTPATIENT
Start: 2024-08-20 | End: 2024-08-20

## 2024-08-20 RX ORDER — DEXAMETHASONE SODIUM PHOSPHATE 4 MG/ML
VIAL (ML) INJECTION AS NEEDED
Status: DISCONTINUED | OUTPATIENT
Start: 2024-08-20 | End: 2024-08-20 | Stop reason: SURG

## 2024-08-20 RX ORDER — ONDANSETRON 2 MG/ML
INJECTION INTRAMUSCULAR; INTRAVENOUS AS NEEDED
Status: DISCONTINUED | OUTPATIENT
Start: 2024-08-20 | End: 2024-08-20 | Stop reason: SURG

## 2024-08-20 RX ORDER — ACETAMINOPHEN 500 MG
1000 TABLET ORAL ONCE
Status: DISCONTINUED | OUTPATIENT
Start: 2024-08-20 | End: 2024-08-20 | Stop reason: HOSPADM

## 2024-08-20 RX ORDER — HYDROMORPHONE HYDROCHLORIDE 1 MG/ML
0.4 INJECTION, SOLUTION INTRAMUSCULAR; INTRAVENOUS; SUBCUTANEOUS EVERY 5 MIN PRN
Status: DISCONTINUED | OUTPATIENT
Start: 2024-08-20 | End: 2024-08-20

## 2024-08-20 RX ORDER — MIDAZOLAM HYDROCHLORIDE 1 MG/ML
1 INJECTION INTRAMUSCULAR; INTRAVENOUS EVERY 5 MIN PRN
Status: DISCONTINUED | OUTPATIENT
Start: 2024-08-20 | End: 2024-08-20

## 2024-08-20 RX ORDER — ALBUTEROL SULFATE 0.83 MG/ML
2.5 SOLUTION RESPIRATORY (INHALATION) AS NEEDED
Status: DISCONTINUED | OUTPATIENT
Start: 2024-08-20 | End: 2024-08-20

## 2024-08-20 RX ORDER — LIDOCAINE HYDROCHLORIDE 10 MG/ML
INJECTION, SOLUTION EPIDURAL; INFILTRATION; INTRACAUDAL; PERINEURAL AS NEEDED
Status: DISCONTINUED | OUTPATIENT
Start: 2024-08-20 | End: 2024-08-20 | Stop reason: SURG

## 2024-08-20 RX ORDER — ONDANSETRON 4 MG/1
4 TABLET, ORALLY DISINTEGRATING ORAL EVERY 8 HOURS PRN
Qty: 10 TABLET | Refills: 0 | Status: SHIPPED | OUTPATIENT
Start: 2024-08-20

## 2024-08-20 RX ORDER — HYDROMORPHONE HYDROCHLORIDE 1 MG/ML
0.6 INJECTION, SOLUTION INTRAMUSCULAR; INTRAVENOUS; SUBCUTANEOUS EVERY 5 MIN PRN
Status: DISCONTINUED | OUTPATIENT
Start: 2024-08-20 | End: 2024-08-20

## 2024-08-20 RX ORDER — SCOLOPAMINE TRANSDERMAL SYSTEM 1 MG/1
1 PATCH, EXTENDED RELEASE TRANSDERMAL ONCE
Status: COMPLETED | OUTPATIENT
Start: 2024-08-20 | End: 2024-08-20

## 2024-08-20 RX ORDER — PROCHLORPERAZINE EDISYLATE 5 MG/ML
5 INJECTION INTRAMUSCULAR; INTRAVENOUS EVERY 8 HOURS PRN
Status: DISCONTINUED | OUTPATIENT
Start: 2024-08-20 | End: 2024-08-20

## 2024-08-20 RX ORDER — TRAMADOL HYDROCHLORIDE 50 MG/1
50 TABLET ORAL ONCE AS NEEDED
Status: COMPLETED | OUTPATIENT
Start: 2024-08-20 | End: 2024-08-20

## 2024-08-20 RX ORDER — MEPERIDINE HYDROCHLORIDE 25 MG/ML
12.5 INJECTION INTRAMUSCULAR; INTRAVENOUS; SUBCUTANEOUS AS NEEDED
Status: DISCONTINUED | OUTPATIENT
Start: 2024-08-20 | End: 2024-08-20

## 2024-08-20 RX ORDER — KETOROLAC TROMETHAMINE 30 MG/ML
INJECTION, SOLUTION INTRAMUSCULAR; INTRAVENOUS AS NEEDED
Status: DISCONTINUED | OUTPATIENT
Start: 2024-08-20 | End: 2024-08-20 | Stop reason: SURG

## 2024-08-20 RX ORDER — ACETAMINOPHEN 500 MG
1000 TABLET ORAL ONCE AS NEEDED
Status: DISCONTINUED | OUTPATIENT
Start: 2024-08-20 | End: 2024-08-20

## 2024-08-20 RX ADMIN — DEXAMETHASONE SODIUM PHOSPHATE 4 MG: 4 MG/ML VIAL (ML) INJECTION at 11:03:00

## 2024-08-20 RX ADMIN — SODIUM CHLORIDE, SODIUM LACTATE, POTASSIUM CHLORIDE, CALCIUM CHLORIDE: 600; 310; 30; 20 INJECTION, SOLUTION INTRAVENOUS at 12:33:00

## 2024-08-20 RX ADMIN — SCOLOPAMINE TRANSDERMAL SYSTEM 1 PATCH: 1 PATCH, EXTENDED RELEASE TRANSDERMAL at 10:54:00

## 2024-08-20 RX ADMIN — SODIUM CHLORIDE, SODIUM LACTATE, POTASSIUM CHLORIDE, CALCIUM CHLORIDE: 600; 310; 30; 20 INJECTION, SOLUTION INTRAVENOUS at 10:54:00

## 2024-08-20 RX ADMIN — MIDAZOLAM HYDROCHLORIDE 2 MG: 1 INJECTION INTRAMUSCULAR; INTRAVENOUS at 10:57:00

## 2024-08-20 RX ADMIN — KETOROLAC TROMETHAMINE 30 MG: 30 INJECTION, SOLUTION INTRAMUSCULAR; INTRAVENOUS at 12:33:00

## 2024-08-20 RX ADMIN — ONDANSETRON 4 MG: 2 INJECTION INTRAMUSCULAR; INTRAVENOUS at 12:33:00

## 2024-08-20 RX ADMIN — LIDOCAINE HYDROCHLORIDE 20 MG: 10 INJECTION, SOLUTION EPIDURAL; INFILTRATION; INTRACAUDAL; PERINEURAL at 10:57:00

## 2024-08-20 NOTE — BRIEF OP NOTE
Pre-Operative Diagnosis: Malignant melanoma of right forearm (HCC) [C43.61]     Post-Operative Diagnosis: Malignant melanoma of right forearm (HCC) [C43.61]      Procedure Performed:   Wide excision melanoma right forearm (Ken)    Surgeons and Role:  Panel 1:     * Raymond Rogers MD - Primary  Panel 2:     * Pricilla Luis MD - Primary    Assistant(s):  Surgical Assistant.: Kaylynn Giles  PA: Delma Norton PA     Surgical Findings: See full operative report      Specimen: Yes     Estimated Blood Loss: 2mL   ANAYA Hugo  8/20/2024  11:37 AM          
Pre-Operative Diagnosis: Malignant melanoma of right forearm (HCC) [C43.61]     Post-Operative Diagnosis: Malignant melanoma of right forearm (HCC) [C43.61]      Procedure Performed:   Wide excision melanoma right forearm (Ken)    Surgeons and Role:  Panel 1:     * Raymond Rogers MD - Primary  Panel 2:     * Pricilla Luis MD - Primary    Assistant(s):  Surgical Assistant.: Kaylynn Giles  PA: Delma Norton PA     Surgical Findings: See full operative report     Specimen: Yes     Estimated Blood Loss: 2 mL       ANAYA Hugo  8/20/2024  11:34 AM          
Pre-Operative Diagnosis: Malignant melanoma of right forearm (HCC) [C43.61]     Post-Operative Diagnosis: Malignant melanoma of right forearm (HCC) [C43.61]      Procedure Performed:   Wide excision melanoma right forearm (Ken)  Local tissue rearrangement and dermal fat graft  Surgeons and Role:  Panel 1:     * Raymond Rogers MD - Primary  Panel 2:     * Pricilla Luis MD - Primary    Assistant(s):  Surgical Assistant.: Kaylynn Giles  PA: Delma Norton PA     Surgical Findings:      Specimen:      Estimated Blood Loss: Blood Output: 5 mL (8/20/2024 12:35 PM)      Dictation Number:      Pricilla Luis MD  8/20/2024  2:04 PM          
No

## 2024-08-20 NOTE — DISCHARGE INSTRUCTIONS
Plastic Surgery Home Care Instructions      We hope you were pleased with your care at [facility].  We wish you the best outcome and overall experience with your operation.  These instructions will help to minimize pain, optimize healing, and improve the likelihood of a successful result.    What To Expect  There may be some spotting of the incision lines for the next few days.  Mild bruising, mild swelling and discomfort in the surgical area is typical.     Bandages (Dressing)  Leave steri-strips in place. If these fall off on their own, that is ok. Apply antibiotic ointment (neosporin, bacitracin, etc) daily if the steri-strips fall off.    Bathing/Showers  You can resume showering tomorrow. Let soap and water run over the incision, don't scrub.   No baths, swimming, or hot tubs until you receive medical permission    Pain Medication: Tramadol  Take one tablet every six hours as needed for pain.  Do not take narcotics, if you do not have pain.   Keep stools soft.  Take a stool softener (Colace).  You can take Miralax once or twice daily and/or Milk of Magnesia daily to help with constipation if needed.   Eating fruit will also help to prevent constipation    Over-The-Counter Medication  You can take Tylenol after surgery for pain relief as needed  You can take Aleve or ibuprofen starting 24 hours after surgery  Take as directed on the bottle    Home Medication  Resume your home medications as instructed  Do not resume herbal medications for two weeks    Diet  Resume your normal diet    Activity  No strenuous activity   You cannot return to physical exercise, sports, or gym workouts until you are allowed to participate in strenuous activity.    Driving  Do not drive, if you are taking pain medication.    Return to Work or School  You can return to work when you are not taking pain medication, if your work does not involve strenuous activity.  Contact the office, if you need a medical note.     Follow-up  Appointment   Our office will call you to set up a time    Questions or Concerns  Call Dr. Luis's office if you experience bleeding that is not controlled by holding pressure for 20 minutes.     Shanice   Thank you for coming to [facility] for your operation.  The nurses and the anesthesiologist try very hard to make sure you receive the best care possible.  Your trust in them is greatly appreciated.    Thanks so much,  Dr. Luis

## 2024-08-20 NOTE — INTERVAL H&P NOTE
There has been no significant change since Dr Rogers saw patient as documented in EPIC.   Surgery revisted.   To proceed as planned.      Patient seen and examined by ANAYA Morgan

## 2024-08-20 NOTE — ANESTHESIA PREPROCEDURE EVALUATION
PRE-OP EVALUATION    Patient Name: Shanice Stanford    Admit Diagnosis: Malignant melanoma of right forearm (HCC) [C43.61]    Pre-op Diagnosis: Malignant melanoma of right forearm (HCC) [C43.61]    Wide excision melanoma right forearm ([place]) Right forearm reconstruction with complex closure, possible grafts, possible local tissue rearrangement (Toby)    Anesthesia Procedure: Wide excision melanoma right forearm ([place]) Right forearm reconstruction with complex closure, possible grafts, possible local tissue rearrangement (Toby) (Right)  .    Surgeons and Role:  Panel 1:     * Raymond Rogers MD - Primary  Panel 2:     * Pricilla Luis MD - Primary    Pre-op vitals reviewed.  Temp: 97.1 °F (36.2 °C)  Pulse: 72  Resp: 18  BP: 130/83  SpO2: 96 %  Body mass index is 25.13 kg/m².    Current medications reviewed.  Hospital Medications:   acetaminophen (Tylenol Extra Strength) tab 1,000 mg  1,000 mg Oral Once    scopolamine (Transderm-Scop) 1 MG/3DAYS patch 1 patch  1 patch Transdermal Once    lactated ringers infusion   Intravenous Continuous    ceFAZolin (Ancef) 2g in 10mL IV syringe premix  2 g Intravenous Once       Outpatient Medications:     Medications Prior to Admission   Medication Sig Dispense Refill Last Dose    LEVOTHROID 75 MCG OR TABS 1 TABLET DAILY   8/20/2024 at 0730       Allergies: Hydrocodone-acetaminophen and Adhesive tape      Anesthesia Evaluation    Patient summary reviewed.    Anesthetic Complications  (+) history of anesthetic complications  History of: PONV       GI/Hepatic/Renal                                 Cardiovascular        Exercise tolerance: good     MET: >4                                           Endo/Other                                  Pulmonary               (-) shortness of breath            Neuro/Psych                                      Past Surgical History:   Procedure Laterality Date    Breast reconstruction      x2    Mastectomy left      Other surgical history   1999    excision of melanoma L thigh    Other surgical history  1995    lithotrypsyx2;removal of stone    Parathyroidectomy      Tonsillectomy       Social History     Socioeconomic History    Marital status:    Tobacco Use    Smoking status: Former     Current packs/day: 0.00     Average packs/day: 0.5 packs/day for 10.0 years (5.0 ttl pk-yrs)     Types: Cigarettes     Start date: 1980     Quit date: 1990     Years since quittin.2   Vaping Use    Vaping status: Never Used   Substance and Sexual Activity    Alcohol use: Yes     Comment: socially    Drug use: No    Sexual activity: Yes     Partners: Male     History   Drug Use No     Available pre-op labs reviewed.               Airway      Mallampati: II  Mouth opening: 3 FB  TM distance: 4 - 6 cm  Neck ROM: full Cardiovascular      Rhythm: regular  Rate: normal     Dental             Pulmonary      Breath sounds clear to auscultation bilaterally.               Other findings              ASA: 2   Plan: general and MAC  NPO status verified and patient meets guidelines.    Post-procedure pain management plan discussed with surgeon and patient.    Comment: Plan is MAC anesthesia, which may include deep sedation.  Implied that memory of procedure is unlikely although intraop recall, if it occurs, may be a reasonable and comfortable experience with this anesthetic.  Aware that general anesthesia is not intended though deep sedation may include occasional moments of general anesthesia.  The backup plan for safe patient care may include change of plan to full general anesthesia with potential airway placement.  Patient (legal guardian) demonstrated understanding, accepts risks and benefits, and wishes to proceed as reflected in the signed consent form.  Difficulty airway equipment available as needed, may need general anesthesia OETT.  Previous anesthesia records reviewed.    Patient with history of lymph node dissection on left side.  Patient prefers IV in left upper extremities. Explained risks of IV placement given history of lymph node removals on that side. Patient understands risks and wishes to proceed with IV placement on left side.   Plan/risks discussed with: patient and spouse                Present on Admission:  **None**

## 2024-08-20 NOTE — ANESTHESIA POSTPROCEDURE EVALUATION
[facility]    [facility]:  [facility]   Age/Gender 58 year old female MRN [id number]   Location [facility] SURGERY Attending Raymond Rogers MD   Hosp Day # 0 PCP Deja Sharif PA-C       Anesthesia Post-op Note    Wide excision melanoma right forearm (Salti)    Procedure Summary       Date: 08/20/24 Room / Location:  MAIN OR 10 / [facility] MAIN OR    Anesthesia Start: 1054 Anesthesia Stop:     Procedures:       Wide excision melanoma right forearm (Ken) (Right: Lower Arm)      Right forearm reconstruction with complex closure, grafts, local tissue rearrangement (Toby) (Right: Lower Arm) Diagnosis:       Malignant melanoma of right forearm (HCC)      (Malignant melanoma of right forearm (HCC) [C43.61])    Surgeons: Raymond Rogers MD; Pricilla Luis MD Anesthesiologist: Matheus Reid MD    Anesthesia Type: general, MAC ASA Status: 2            Anesthesia Type: MAC    Vitals Value Taken Time   /69 08/20/24 1245   Temp 98.2 degrees F 08/20/24 1245   Pulse 85 08/20/24 1245   Resp 16 08/20/24 1245   SpO2 94 08/20/24 1245       Patient Location: Same Day Surgery    Anesthesia Type: MAC    Airway Patency: patent    Postop Pain Control: adequate    Mental Status: mildly sedated but able to meaningfully participate in the post-anesthesia evaluation    Nausea/Vomiting: none    Cardiopulmonary/Hydration status: stable euvolemic    Complications: no apparent anesthesia related complications    Postop vital signs: stable    Dental Exam: Unchanged from Preop    Patient to be discharged home when criteria met.

## 2024-08-21 ENCOUNTER — TELEPHONE (OUTPATIENT)
Dept: SURGERY | Facility: CLINIC | Age: 59
End: 2024-08-21

## 2024-08-21 NOTE — TELEPHONE ENCOUNTER
Called to check on patient after procedure.  Patient states that surgical dressing is intact.  Patient has no swelling, redness or fevers.  Pt having some discomfort that is controlled with tramadol.  Confirmed post op appointment for Monday.

## 2024-08-21 NOTE — OPERATIVE REPORT
[facility]    PATIENT'S NAME: SHAMEKA MAGUIRE   ATTENDING PHYSICIAN: Raymond Rogers MD   OPERATING PHYSICIAN: Pricilla Luis MD   PATIENT ACCOUNT#:   [id number]    LOCATION:  [facility] 10  MEDICAL RECORD #:   [id number]       YOB: 1965  ADMISSION DATE:       08/20/2024      OPERATION DATE:  08/20/2024    OPERATIVE REPORT      PREOPERATIVE DIAGNOSIS:  Malignant melanoma, right forearm; open wound, right forearm.   POSTOPERATIVE DIAGNOSIS:  Malignant melanoma, right forearm; open wound, right forearm.  PROCEDURE:  Right forearm reconstruction with complex layered closure, dermal fat graft.    ASSISTANT:  FABIAN Grande.    ANESTHESIA:  MAC anesthesia.    COMPLICATIONS:  None.     BLOOD LOSS:  Minimal.    INDICATIONS:  This is a 58-year-old female with malignant melanoma of her forearm for which she was seen in the office to discuss wide local excision by Dr. Rogers, and we discussed reconstruction with me.  We had a lengthy discussion about the tightness of the tissue, and patient specifically stated that she would prefer a longer scar trying to minimize any contour irregularity and dog ears, and if possible avoid a flap incision pattern.  She understands the limitations and potential need for additional surgery down the line.  Potential risks, complications, benefits and alternatives were discussed.  Risks and complications including, but not limited to, infection, bleeding, scarring, damage to surrounding structures, hematoma, seroma, wound dehiscence, numbness, contour irregularity, need for further surgery.  The patient understands and wishes to proceed.  Questions were answered.      OPERATIVE TECHNIQUE:  The patient was identified in the preoperative area by Dr. Rogers and brought to the operating room.  I was called into the room after Dr. Rogers had completed his portion of the procedure, wide local excision of the melanoma.  At that time, the patient was under  anesthesia, sterilely prepped and draped, in stable condition.  There was an open wound on the right forearm measuring 5 x 5 cm, and the area was evaluated and undermining was performed in the subcutaneous plane to release and advance the tissue.  This was done in the most relax position in diagonal fashion and additional subcutaneous fat was taken from the dog ear on both sides to reduce the dog ears.  The skin from the dog ears was excised and sent to Pathology separately.  Then, the entire wound length was measuring 13 cm.  Then the dermal fat graft in the center helped to smoothen all the contour irregularities.  This was then closed with 3-0 Vicryl sutures for the deep dermal layer, followed by 4-0 Monocryl subcuticular sutures for the skin, Dermabond and Steri-Strips.  TopiFoam and Ace bandage were applied.  The patient tolerated the procedure well and awoke from anesthesia without difficulty.  All sponge and needle counts were correct at the end of the case.     Dictated By Pricilla Luis MD  d: 08/20/2024 16:56:12  t: 08/21/2024 00:39:04  Job [id number]  [facility] [place]/

## 2024-08-21 NOTE — OPERATIVE REPORT
[facility]    PATIENT'S NAME: SHAMEKA MAGUIRE   ATTENDING PHYSICIAN: Raymond Rogers MD   OPERATING PHYSICIAN: Raymond Rogers MD   PATIENT ACCOUNT#:   [id number]    LOCATION:  [facility] 10  MEDICAL RECORD #:   [id number]       YOB: 1965  ADMISSION DATE:       08/20/2024      OPERATION DATE:  08/20/2024    OPERATIVE REPORT    PREOPERATIVE DIAGNOSIS:  Malignant melanoma, right forearm.    POSTOPERATIVE DIAGNOSIS:  Malignant melanoma, right forearm.    PROCEDURE:  1.   Wide excision of malignant melanoma, right forearm (3 cm).  2.   Closure with Dr. Luis, Plastic Surgery.    ASSISTANT:  Aury Norton PA-C.    ANESTHESIA:  Monitored anesthesia care.    INDICATIONS:  Patient is a 58-year-old woman who was diagnosed with a 0.5 mm thick melanoma of the right forearm.  She presents today for wide excision and reconstruction.  The surgical treatment matter had been discussed with her including indications.    OPERATIVE TECHNIQUE:  The patient was brought to the operating room and was placed in supine position.  She was given IV sedation by the anesthesiology service.  Sequential compression boots were placed.  Patient received preoperative intravenous antibiotics prophylaxis.  She was prepped and draped in normal sterile fashion.  A 1 cm margin was marked around the biopsy site, and a circular incision measuring 3 cm in width was made and deepened to underlying muscle fascia.  The specimen was excised in toto, oriented, and sent to Pathology.  Hemostasis was achieved and maintained.  At this point, Dr. Luis presented for wound closure, and her portion of the dictation should be noted as well.    The patient tolerated my portion of the procedure well without immediate complications.    Wide Local Excision for Primary Cutaneous Melanoma - Excision 1 (Upper Arm - Right)  Operation performed with curative intent Yes   Original Breslow thickness of the lesion  0.5 mm (to the  tenth of a millimeter)   Clinical margin width  (measured from the edge of the lesion or the prior excision scar) 1 cm   Depth of excision Full-thickness skin/subcutaneous tissue down to fascia (melanoma)       Dictated By Raymond Rogers MD  d: 08/20/2024 14:08:32  t: 08/20/2024 17:38:36  [facility] [id number]  [facility]/

## 2024-08-22 ENCOUNTER — TELEPHONE (OUTPATIENT)
Dept: SURGERY | Facility: CLINIC | Age: 59
End: 2024-08-22

## 2024-08-22 NOTE — TELEPHONE ENCOUNTER
LVM for patient regarding pathology results for excision of right forearm melanoma, all margins clear. She is aware there is no need for re-excision. Will follow-up in office on Monday or patient can call back.     ANAYA Hugo

## 2024-08-25 NOTE — PROGRESS NOTES
[facility]        Patient Name:  Shanice Stanford   YOB: 1965   Gender:  Female   Appt Date:  8/26/2024   Provider:  ANAYA Hugo     PATIENT PROVIDERS  Primary Care Provider:Deja Sharif PA-C   Address: [facility] [place] [address]   Phone #: [phone]       CHIEF COMPLAINT  Malignant melanoma     PROBLEMS  Reviewed   Patient Active Problem List   Diagnosis    Displacement of lumbar intervertebral disc without myelopathy    Cervicalgia    Degeneration of cervical intervertebral disc    Lumbago    Multiple endocrine neoplasia type I (HCC)    History of breast cancer    Atypical nevus    Malignant melanoma of right forearm (HCC)    History of melanoma excision        History of Present Illness:  Stage 1A: pT1a     She is a 58 year old women who was referred for consideration of surgical oncology management of recently diagnosed melanoma of right forearm.   She saw me in consultation on 7/1/2024 and the site was swollen.    Patient had concerns of a skin lesion and was seen by Dr. Lori Villareal on 06/18/2024 skin examination.  She noticed this lesion in winter as a dry spot. She denies any bleeding, pruritus, or scaling of the lesion. In office, an excisional biopsy was performed. This revealed a tumor thickness of 0.5 mm (pT1a).      Patient has MEN, she is followed by Dr. Joy Cannon at [facility].    Wide excision melanoma right forearm---> Margins free: pT1a: Stage 1A    Interval history:   Patient has not been requiring any pain medications, she states she is doing okay since surgery. She does experience fatigue, and at times does not feel like herself. She has experienced this in the past post anesthesia. She states it takes her sometime to recover. Endorses shortness of breath when walking up the stairs. No family or personal history of clotting. Denies fevers, chills or any cramping/pain in the calves.      Vital Signs:  /78 (BP  Location: Left arm, Patient Position: Sitting, Cuff Size: adult)   Pulse 84   Temp 97.5 °F (36.4 °C) (Temporal)   Resp 16   Ht 1.651 m (5' 5\")   Wt 68.9 kg (151 lb 12.8 oz)   LMP 01/15/2010   SpO2 98%   BMI 25.26 kg/m²      Medications Reviewed:    Current Outpatient Medications:     LEVOTHROID 75 MCG OR TABS, 1 TABLET DAILY, Disp: , Rfl:     traMADol 50 MG Oral Tab, Take 1-2 tablets ( mg total) by mouth every 6 (six) hours as needed for Pain. (Patient not taking: Reported on 2024), Disp: 15 tablet, Rfl: 0    ondansetron 4 MG Oral Tablet Dispersible, Take 1 tablet (4 mg total) by mouth every 8 (eight) hours as needed. (Patient not taking: Reported on 2024), Disp: 10 tablet, Rfl: 0     Allergies Reviewed:  Allergies   Allergen Reactions    Hydrocodone-Acetaminophen NAUSEA AND VOMITING    Adhesive Tape ITCHING     More with length of time        History:  Reviewed:  Past Medical History:    Calculus of kidney    CANCER    skin    Cancer (HCC)    Left Breast Cancer    Cancer (HCC)    Left posterior thigh melanoma    Disorder of thyroid    Hx of breast cancer    HYPOTHYROIDISM    KIDNEY STONE    OTHER DISEASES    hx multiple endocrine neoplasia    PONV (postoperative nausea and vomiting)    nausea    VARICELLA    Visual impairment    reading glasses      Reviewed:  Past Surgical History:   Procedure Laterality Date    Breast reconstruction      x2    Mastectomy left      Other surgical history  1999    excision of melanoma L thigh    Other surgical history  1995    lithotrypsyx2;removal of stone    Parathyroidectomy      Tonsillectomy        Reviewed Social History:  Social History     Socioeconomic History    Marital status:    Tobacco Use    Smoking status: Former     Current packs/day: 0.00     Average packs/day: 0.5 packs/day for 10.0 years (5.0 ttl pk-yrs)     Types: Cigarettes     Start date: 1980     Quit date: 1990     Years since quittin.2   Vaping Use     Vaping status: Never Used   Substance and Sexual Activity    Alcohol use: Yes     Comment: socially    Drug use: No    Sexual activity: Yes     Partners: Male      Reviewed:  Family History   Problem Relation Age of Onset    Neurological Disorder Mother         hx benign pituitary tumor    Other Mother         multiple endocrin neoplasia    Diabetes Mother     Stroke Mother     Pulmonary Disease Mother         pneumonia    Renal Disease Mother         kidney stones    Pulmonary Disease Father         COPD    Heart Disease Father       Review of Systems:  Doing well post-operatively  Denies fever or chills  Denies chest pain. Mild SOB  Denies abdominal pain or N/V  Denies dysuria or hematuria  Denies warmth, erythema, or drainage at incision        Physical Examination:  Constitutional: General Appearance: healthy-appearing, well-nourished, and well-developed. Level of Distress: NAD.   Eyes: Sclera: non-icteric.   Neck: Neck: supple.   Lungs: Auscultation: breath sounds normal.   Cardiovascular: Heart Auscultation: RRR.   Abdomen: Inspection and Palpation: no masses, tenderness (no guarding, no rebound), or CVA tenderness and soft and non-distended.  Musculoskeletal: Extremities: no edema.   Skin: Right forearm incision covered with steri strips. No drainage or erythema noted.       Document Review:      Component  Ref Range & Units    Case Report   Surgical Pathology                                Case: [id number]                                   Authorizing Provider:  Raymond Rogers MD          Collected:           08/20/2024 11:20 AM           Ordering Location:     [facility]    Received:            08/20/2024 04:22 PM           Pathologist:           Jono Begum MD                                                         Specimens:   A) - Arm, right, Right forearm melanoma, Black 12 oclock, White 9 oclock                            B) - Arm, right, Right forearm melanoma excess skin- 1-3 oclock  proximal                            C) - Arm, right, Right forearm melanoma excess skin- double stitch is 6 oclock,                      sinlge long stitch is 9 oclock                                                            Final Diagnosis:   A.  Skin, right forearm, excision:  -Focal residual melanoma in situ, margins free.              -Nearest peripheral margin: 0.9 cm (9 mm).  -Reparative changes consistent previous operative site.  -Incidental intradermal nevus.     B.  Skin, right forearm excess skin proximal, excision:  -Benign skin.  -Negative for melanoma.     C.  Skin, right forearm excess skin distal, excision:  -Benign skin.  -Negative for melanoma.           Procedure(s):  None     Assessment / Plan:  Malignant Melanoma of Right Forearm  Stage IA: pT1a  - No acute surgical issues  - Incision site healing well without erythema or drainage, covered with steri-strips.   - Pathology reviewed with patient. She expressed understanding.   - She is aware of return precautions/need to follow-up if there are any changes in symptoms.     I met with Megan Stanford to provide a survivorship care plan and information related to post-treatment care. She has a history of malignant melanoma of right forearm     Survivorship Care Plan: The patient was provided a hard copy of the survivorship care plan. We reviewed all elements of the document. She is aware a copy was also sent to her PCP.      Reviewed melanoma specific surveillance to include follow-up with dermatology at 3 months and follow-up with surgical oncology (Dr. Rogers) at 6 months. She will also continue monthly skin exams to include regional lymph node basin.      Reviewed schedule of other clinic visits: Follow-up with your primary care physician to manage all general health care recommendations for your age and gender including cancer screening tests.     Reviewed concerning signs and symptoms that she should report to any provider including the  Len of melanoma.      Reviewed common cancer survivor issues and resource available.      Reviewed lifestyle/behaviors that can affect ongoing health including sun safety and maintaining a healthy weight and diet.     The patient received a copy of their survivorship care plan.     The patient was given the opportunity to ask questions. She was encouraged to call with any further questions or concerns.        Follow Up:  In 2 weeks with Dr. Luis   In 3 months with Dr. Villareal   In 6 months with Dr. Rogers        Electronically Signed by: ANAYA Hugo

## 2024-08-26 ENCOUNTER — TELEPHONE (OUTPATIENT)
Dept: SURGERY | Facility: CLINIC | Age: 59
End: 2024-08-26

## 2024-08-26 ENCOUNTER — OFFICE VISIT (OUTPATIENT)
Dept: SURGERY | Facility: CLINIC | Age: 59
End: 2024-08-26
Payer: COMMERCIAL

## 2024-08-26 VITALS
SYSTOLIC BLOOD PRESSURE: 137 MMHG | WEIGHT: 151.81 LBS | TEMPERATURE: 98 F | BODY MASS INDEX: 25.29 KG/M2 | DIASTOLIC BLOOD PRESSURE: 78 MMHG | HEART RATE: 84 BPM | OXYGEN SATURATION: 98 % | RESPIRATION RATE: 16 BRPM | HEIGHT: 65 IN

## 2024-08-26 DIAGNOSIS — C43.61 MALIGNANT MELANOMA OF RIGHT FOREARM (HCC): Primary | ICD-10-CM

## 2024-08-26 PROCEDURE — 99024 POSTOP FOLLOW-UP VISIT: CPT | Performed by: PHYSICIAN ASSISTANT

## 2024-08-26 PROCEDURE — 3075F SYST BP GE 130 - 139MM HG: CPT | Performed by: PHYSICIAN ASSISTANT

## 2024-08-26 PROCEDURE — 3008F BODY MASS INDEX DOCD: CPT | Performed by: PHYSICIAN ASSISTANT

## 2024-08-26 PROCEDURE — 3078F DIAST BP <80 MM HG: CPT | Performed by: PHYSICIAN ASSISTANT

## 2024-08-26 NOTE — TELEPHONE ENCOUNTER
Medical certification received and logged for processing, - Valid authorization in patient chart

## 2024-08-26 NOTE — TELEPHONE ENCOUNTER
Dr. Luis      Please sign off on form if you agree to: Medical Certification Procedure confirmation   (place your signature on the first page only)    -From your Inbasket, Highlight the patient and click Chart   -Double click the 08/26/24 Forms Completion telephone encounter  -Scroll down to the Media section   -Click the blue Hyperlink: Luma Luis 08/26/24  -Click Acknowledge located in the top right ribbon/menu   -Drag the mouse into the blank space of the document and a + sign will appear. Left click to   electronically sign the document.     Thank you,     Preeti

## 2024-08-29 NOTE — TELEPHONE ENCOUNTER
Forms E faxed     Efax Date:08/29/24  Time fax sent:8:10 Am  Confirmation time : 3:01 PM  JOB ID:[id number]    Forms completed and signed faxed to [facility] [phone]

## 2024-09-09 NOTE — PROGRESS NOTES
Shanice Stanford is a 58 year old female who presents today for a follow-up after wide excision of malignant melanoma, right forearm (3 cm) with Dr. Rogers followed by right forearm reconstruction with complex layered closure, dermal fat graft, on 8/20/2024.    She denies fever and chills. She denies nausea, vomiting, diarrhea or constipation.   Her pain is controlled.  She is here today for scar check.     Physical Exam     Right forearm incision is clean, dry, and intact. No wound drainage or wound dehiscence. No erythema or ecchymosis.     There were no vitals filed for this visit.      Assessment and Plan     Shanice Stanford is doing well s/p wide excision of malignant melanoma, right forearm (3 cm) with Dr. Rogers followed by right forearm reconstruction with complex layered closure, dermal fat graft, on 8/20/2024.    Her incision is healing well. We reviewed options for scar management including vitamin E oil, silicone products, scar massage and sun protection/sun block.     She will follow up in one year. She will call with any questions or concerns.     Questions were answered. Patient understands.

## 2024-09-11 ENCOUNTER — OFFICE VISIT (OUTPATIENT)
Dept: SURGERY | Facility: CLINIC | Age: 59
End: 2024-09-11
Payer: COMMERCIAL

## 2024-09-11 DIAGNOSIS — Z98.890 HISTORY OF MELANOMA EXCISION: Primary | ICD-10-CM

## 2024-09-11 DIAGNOSIS — Z85.820 HISTORY OF MELANOMA EXCISION: Primary | ICD-10-CM

## 2024-09-11 DIAGNOSIS — C43.61 MALIGNANT MELANOMA OF RIGHT FOREARM (HCC): ICD-10-CM

## 2024-09-11 PROCEDURE — 99024 POSTOP FOLLOW-UP VISIT: CPT | Performed by: SURGERY

## 2025-02-19 ENCOUNTER — OFFICE VISIT (OUTPATIENT)
Dept: SURGERY | Facility: CLINIC | Age: 60
End: 2025-02-19
Payer: COMMERCIAL

## 2025-02-19 VITALS
WEIGHT: 155 LBS | RESPIRATION RATE: 16 BRPM | SYSTOLIC BLOOD PRESSURE: 121 MMHG | BODY MASS INDEX: 25.83 KG/M2 | OXYGEN SATURATION: 96 % | HEART RATE: 88 BPM | DIASTOLIC BLOOD PRESSURE: 79 MMHG | HEIGHT: 65 IN | TEMPERATURE: 97 F

## 2025-02-19 DIAGNOSIS — C43.61 MALIGNANT MELANOMA OF RIGHT FOREARM (HCC): Primary | ICD-10-CM

## 2025-02-19 PROCEDURE — 99213 OFFICE O/P EST LOW 20 MIN: CPT | Performed by: SURGERY

## 2025-02-19 PROCEDURE — 3078F DIAST BP <80 MM HG: CPT | Performed by: SURGERY

## 2025-02-19 PROCEDURE — 3008F BODY MASS INDEX DOCD: CPT | Performed by: SURGERY

## 2025-02-19 PROCEDURE — 3074F SYST BP LT 130 MM HG: CPT | Performed by: SURGERY

## 2025-02-19 NOTE — CONSULTS
Estabilshed Patient Consultation    Chief Complaint: s/p right forearm reconstruction     History of Present Illness:   Shanice Stanford is a 59 year old female who returns to the office s/p wide excision of malignant melanoma, right forearm (3 cm) with Dr. Rogers followed by right forearm reconstruction with complex layered closure, dermal fat graft, on 8/20/2024. She is here today for scar check.       Past Medical History:      Past Medical History:    Calculus of kidney    CANCER    skin    Cancer (HCC)    Left Breast Cancer    Cancer (HCC)    Left posterior thigh melanoma    Disorder of thyroid    Hx of breast cancer    HYPOTHYROIDISM    KIDNEY STONE    OTHER DISEASES    hx multiple endocrine neoplasia    PONV (postoperative nausea and vomiting)    nausea    VARICELLA    Visual impairment    reading glasses         Past Surgical History:  Past Surgical History:   Procedure Laterality Date    Breast reconstruction      x2    Mastectomy left      Other surgical history  02/01/1999    excision of melanoma L thigh    Other surgical history  01/01/1995    lithotrypsyx2;removal of stone    Parathyroidectomy      Tonsillectomy           Medications:    No outpatient medications have been marked as taking for the 2/19/25 encounter (Office Visit) with Pricilla Luis MD.         Allergies:    Allergies[1]      Family History:   Family History   Problem Relation Age of Onset    Neurological Disorder Mother         hx benign pituitary tumor    Other Mother         multiple endocrin neoplasia    Diabetes Mother     Stroke Mother     Pulmonary Disease Mother         pneumonia    Renal Disease Mother         kidney stones    Pulmonary Disease Father         COPD    Heart Disease Father          Social History:  History   Alcohol Use    Yes     Comment: socially       History   Smoking Status    Former    Types: Cigarettes   Smokeless Tobacco    Not on file       History   Drug Use No         Review of Systems:    The  patient reports: see HPI  All other systems are unremarkable.      Physical Exam:    LMP 01/15/2010     Constitutional: The patient is an alert, oriented and well-developed.     Neurologic: Speech patterns and movements are normal.     Psychiatric: Affect is appropriate.    Eyes: Conjunctiva are clear, non-icteric. PERRL    ENT: no obvious abnormality, no ear drainage, mucous membranes moist and pink    Integument/Skin: see musculoskeletal    Respiratory: Normal respiratory effort.     Cardiovascular: no cyanosis, no edema    Musculoskeletal: right forearm incision well healed with good contour, slight redness centrally; no erythema      Impression:   Shanice Stanford  is a 59 year old s/p wide excision of malignant melanoma, right forearm (3 cm) with Dr. Rogers followed by right forearm reconstruction with complex layered closure, dermal fat graft, on 8/20/2024    Discussion and Plan:  The patient was counseled on the different treatment options.     She can continue scar management. She will follow up with her dermatologist as recommended. She will follow up annually.    25 minutes were spent with the patient, from which 20 minutes were spent counseling the patient and coordinating care.         [1]   Allergies  Allergen Reactions    Hydrocodone-Acetaminophen NAUSEA AND VOMITING    Adhesive Tape ITCHING     More with length of time

## 2025-02-20 NOTE — PROGRESS NOTES
[facility]        Patient Name:  Shanice Stanford   YOB: 1965   Gender:  Female   Appt Date:  2/19/2025   Provider:  Raymond Rogers MD     PATIENT PROVIDERS    Primary Care Provider:Deja Sharif PA-C        CHIEF COMPLAINT  New Consult: Melanoma of right forearm     PROBLEMS  Reviewed   Patient Active Problem List   Diagnosis    Displacement of lumbar intervertebral disc without myelopathy    Cervicalgia    Degeneration of cervical intervertebral disc    Lumbago    Multiple endocrine neoplasia type I (HCC)    History of breast cancer    Atypical nevus    Malignant melanoma of right forearm (HCC)    History of melanoma excision        History of Present Illness:  Patient returns today for a follow-up visit.    She is a 58 year old women who is currently being referred for consideration of surgical oncology management of recently diagnosed melanoma of right forearm.   She saw me in consultation on 7/1/2024 and the site was swollen.  We are allowing for.  Of healing and thus this follow-up visit.    History:  Patient had concerns of a skin lesion and was seen by Dr. Lori Villareal on 06/18/2024 skin examination.  She noticed this lesion in winter as a dry spot. She denies any bleeding, pruritus, or scaling of the lesion. In office, an excisional biopsy was performed. This revealed a tumor thickness of 0.5 mm (pT1a).     Patient has MEN, she is followed by Dr. Joy Cannon at [facility].     Vital Signs:  /79   Pulse 88   Temp 96.9 °F (36.1 °C) (Temporal)   Resp 16   Ht 1.651 m (5' 5\")   Wt 70.3 kg (155 lb)   LMP 01/15/2010   SpO2 96%   BMI 25.79 kg/m²      Medications Reviewed:    Current Outpatient Medications:     traMADol 50 MG Oral Tab, Take 1-2 tablets ( mg total) by mouth every 6 (six) hours as needed for Pain. (Patient not taking: Reported on 8/26/2024), Disp: 15 tablet, Rfl: 0    ondansetron 4 MG Oral Tablet Dispersible, Take 1 tablet (4 mg  total) by mouth every 8 (eight) hours as needed. (Patient not taking: Reported on 2024), Disp: 10 tablet, Rfl: 0    LEVOTHROID 75 MCG OR TABS, 1 TABLET DAILY, Disp: , Rfl:      Allergies Reviewed:  Allergies   Allergen Reactions    Hydrocodone-Acetaminophen NAUSEA AND VOMITING    Adhesive Tape ITCHING     More with length of time        History:  Reviewed:  Past Medical History:    Calculus of kidney    CANCER    skin    Cancer (HCC)    Left Breast Cancer    Cancer (HCC)    Left posterior thigh melanoma    Disorder of thyroid    Hx of breast cancer    HYPOTHYROIDISM    KIDNEY STONE    OTHER DISEASES    hx multiple endocrine neoplasia    PONV (postoperative nausea and vomiting)    nausea    VARICELLA    Visual impairment    reading glasses      Reviewed:  Past Surgical History:   Procedure Laterality Date    Breast reconstruction      x2    Mastectomy left      Other surgical history  1999    excision of melanoma L thigh    Other surgical history  1995    lithotrypsyx2;removal of stone    Parathyroidectomy      Tonsillectomy        Reviewed Social History:  Social History     Socioeconomic History    Marital status:    Tobacco Use    Smoking status: Former     Current packs/day: 0.00     Average packs/day: 0.5 packs/day for 10.0 years (5.0 ttl pk-yrs)     Types: Cigarettes     Start date: 1980     Quit date: 1990     Years since quittin.7   Vaping Use    Vaping status: Never Used   Substance and Sexual Activity    Alcohol use: Yes     Comment: socially    Drug use: No    Sexual activity: Yes     Partners: Male      Reviewed:  Family History   Problem Relation Age of Onset    Neurological Disorder Mother         hx benign pituitary tumor    Other Mother         multiple endocrin neoplasia    Diabetes Mother     Stroke Mother     Pulmonary Disease Mother         pneumonia    Renal Disease Mother         kidney stones    Pulmonary Disease Father         COPD    Heart Disease Father        Review of Systems:  Patient reports no rapid or irregular heartbeat. She reports no chest pain. She reports no nausea. She reports no vomiting. She reports no diarrhea. She reports no constipation. She reports no bright red blood per rectum. She reports no fatigue. She reports no blood in the urine hematuria, no changes in urinary habits: initiating urination requires straining, no changes in urinary habits: no hesitancy, and no change in urine appearance. She reports no headache. She reports no dizziness. She reports no easy bruising tendency. She reports no diffuse joint pains. She reports no bone pain. She reports no back pain. She reports no swollen glands. She reports no pain. She reports no numbness. She reports no shortness of breath. She reports no change in a mole. She reports no recent change in weight, no fever, no chills, and no sweating heavily at night.       Physical Examination:  Constitutional: NAD.   Eyes: Sclera: non-icteric.   Abdomen: Soft, no masses.  Musculoskeletal: Extremities: no edema.   Skin: The scalp an remainder of the skin do not show any evidence for new or suspicious lesions. The wide excision site is well healed. There is no evidence for local recurrence. There are no intransit metastases.        Document Review:  None     Procedure(s):  None     Assessment / Plan:  Malignant melanoma of right forearm (pT1a cN0)  Prior history of melanoma left posterior thigh.  Doing well and remains PHILLIP.  Continue follow-up with dermatology.  Continue monthly self skin examination.  Return to see me on a as needed basis.  Instructions discussed.  Patient understands to call or return with any concern.        Follow Up:  PRN       Electronically Signed by:     Raymond Rogers MD FACS  Chief of Surgical Oncology, [facility]  Professor of Surgery, [facility]  [phone]

## (undated) DEVICE — ELECTRODE ES 2.75IN PTFE BLDE MOD E-Z CLN

## (undated) DEVICE — MARKER SKIN PREP-RESISTANT ST: Brand: MEDLINE INDUSTRIES, INC.

## (undated) DEVICE — SUT PERMA- 2-0 30IN SH NABSRB BLK L26MM 1/

## (undated) DEVICE — GLOVE SUR 7 SENSICARE PI PIP GRN PWD F

## (undated) DEVICE — PACK DRAPE HEAD/NECK STER

## (undated) DEVICE — SYRINGE MED 10ML LL CTRL W/ FNGR GRP CLR BRL

## (undated) DEVICE — SOLUTION IRRIG 1000ML 0.9% NACL USP BTL

## (undated) DEVICE — SHEET, DRAPE, SPLIT, STERILE: Brand: MEDLINE

## (undated) DEVICE — CLIP LIG M BLU TI HRT SHP WIRE HORZ

## (undated) DEVICE — E-Z CLEAN, NON-STICK, PTFE COATED, ELECTROSURGICAL NEEDLE ELECTRODE, MODIFIED EXTENDED INSULATION, 2.75 INCH (7 CM): Brand: MEGADYNE

## (undated) DEVICE — YANKAUER,FLEXIBLE HANDLE,REGLR CAPACITY: Brand: MEDLINE INDUSTRIES, INC.

## (undated) DEVICE — BLADE 24 SS SRG STRL

## (undated) DEVICE — 3M™ IOBAN™ 2 ANTIMICROBIAL INCISE DRAPE 6650EZ: Brand: IOBAN™ 2

## (undated) DEVICE — PAD,NON-ADHERENT,3X8,STERILE,LF,1/PK: Brand: MEDLINE

## (undated) DEVICE — LAPAROTOMY SPONGE - RF AND X-RAY DETECTABLE PRE-WASHED: Brand: SITUATE

## (undated) DEVICE — #15 STERILE STAINLESS BLADE: Brand: STERILE STAINLESS BLADES

## (undated) DEVICE — GLOVE SUR 6.5 SENSICARE PI PIP CRM PWD F

## (undated) DEVICE — SHEET,DRAPE,40X58,STERILE: Brand: MEDLINE

## (undated) DEVICE — SUT MCRYL 4-0 18IN PS-2 ABSRB UD 19MM 3/8 CIR

## (undated) DEVICE — COVER LT HNDL RIG FOR SUR CAM DISP

## (undated) DEVICE — ANTIBACTERIAL UNDYED BRAIDED (POLYGLACTIN 910), SYNTHETIC ABSORBABLE SUTURE: Brand: COATED VICRYL

## (undated) DEVICE — ADHESIVE SKIN TOP FOR WND CLSR DERMBND ADV

## (undated) DEVICE — SUT COAT VCRL 2-0 27IN SH ABSRB UD 26MM 1/2

## (undated) DEVICE — #10 STERILE BLADE: Brand: POLYMER COATED BLADES

## (undated) DEVICE — SLEEVE COMPR MD KNEE LEN SGL USE KENDALL SCD

## (undated) DEVICE — SYRINGE MED 10ML LL TIP W/O SFTY DISP

## (undated) DEVICE — PROXIMATE SKIN STAPLERS (35 WIDE) CONTAINS 35 STAINLESS STEEL STAPLES (FIXED HEAD): Brand: PROXIMATE

## (undated) NOTE — LETTER
OUTSIDE TESTING RESULT REQUEST     IMPORTANT: FOR YOUR IMMEDIATE ATTENTION  Please FAX all test results listed below to: [phone]     Testing already done on or about: pt will call office     * * * * If testing is NOT complete, arrange with patient A.S.A.P. * * * *      Patient Name: Shanice Stanford  Surgery Date: 2024  Medical Record: [id number]  CSN: [id number]  : 1965 - A: 58 y     Sex: female  Surgeon(s):  Raymond Rogers MD Seitz, Iris A., MD  Procedure: Wide excision melanoma right forearm (Ken) Right forearm reconstruction with complex closure, possible grafts, possible local tissue rearrangement (Toby)  Anesthesia Type: MAC     Surgeon: Raymond Rogers MD     The following Testing and Time Line are REQUIRED PER ANESTHESIA     EKG READ AND SIGNED WITHIN   90 days  CBC [with Differential & Platelets] within  90 days      Thank You,   Sent by:Courtney MCKEON

## (undated) NOTE — LETTER
To:  Deja Sharif  Date:  2024  Patient Name: Shanice Stanford  -Age / Sex: 1965-A: 58 y  female  Medical Records: [id number]   CSN: [id number]      Clearance for Surgery Requested by Surgeon    Request for:  Medical Clearance    Requested by Surgeon: Dr Luis    Surgical Date: 2024    Procedure: Wide excision melanoma right forearm (Ken) Right forearm reconstruction with complex closure, possible grafts, possible local tissue rearrangement (Toby), Right  ., Right  .      Please fax the clearance note to the Pre-Admission Testing department.  Thank you.